# Patient Record
Sex: MALE | Race: OTHER | HISPANIC OR LATINO | URBAN - METROPOLITAN AREA
[De-identification: names, ages, dates, MRNs, and addresses within clinical notes are randomized per-mention and may not be internally consistent; named-entity substitution may affect disease eponyms.]

---

## 2017-10-15 ENCOUNTER — EMERGENCY (EMERGENCY)
Facility: HOSPITAL | Age: 32
LOS: 0 days | Discharge: HOME | End: 2017-10-15
Attending: EMERGENCY MEDICINE | Admitting: EMERGENCY MEDICINE
Payer: COMMERCIAL

## 2017-10-15 VITALS
RESPIRATION RATE: 20 BRPM | HEART RATE: 83 BPM | DIASTOLIC BLOOD PRESSURE: 85 MMHG | TEMPERATURE: 98 F | OXYGEN SATURATION: 100 % | SYSTOLIC BLOOD PRESSURE: 146 MMHG

## 2017-10-15 VITALS — WEIGHT: 179.9 LBS

## 2017-10-15 DIAGNOSIS — M54.30 SCIATICA, UNSPECIFIED SIDE: ICD-10-CM

## 2017-10-15 DIAGNOSIS — M54.32 SCIATICA, LEFT SIDE: ICD-10-CM

## 2017-10-15 PROCEDURE — 99283 EMERGENCY DEPT VISIT LOW MDM: CPT

## 2017-10-15 RX ORDER — CYCLOBENZAPRINE HYDROCHLORIDE 10 MG/1
1 TABLET, FILM COATED ORAL
Qty: 15 | Refills: 0 | OUTPATIENT
Start: 2017-10-15 | End: 2017-10-20

## 2017-10-15 RX ORDER — KETOROLAC TROMETHAMINE 30 MG/ML
30 SYRINGE (ML) INJECTION ONCE
Qty: 0 | Refills: 0 | Status: DISCONTINUED | OUTPATIENT
Start: 2017-10-15 | End: 2017-10-15

## 2017-10-15 RX ORDER — DIAZEPAM 5 MG
5 TABLET ORAL ONCE
Qty: 0 | Refills: 0 | Status: DISCONTINUED | OUTPATIENT
Start: 2017-10-15 | End: 2017-10-15

## 2017-10-15 RX ADMIN — Medication 30 MILLIGRAM(S): at 16:45

## 2017-10-15 RX ADMIN — Medication 5 MILLIGRAM(S): at 16:45

## 2017-10-15 NOTE — ED STATDOCS - NS_ ATTENDINGSCRIBEDETAILS _ED_A_ED_FT
Jason Coley DO (Attending): The history, relevant review of systems, past medical and surgical history, medical decision making, and physical examination was documented by the scribe in my presence and I attest to the accuracy of the documentation.

## 2017-10-15 NOTE — ED STATDOCS - PHYSICAL EXAMINATION
GEN: AOX3, NAD. HEENT: Throat clear. Head NC/AT. NECK: Supple, No JVD. FROM. C-spine non-tender. CV:S1S2, RRR, LUNGS: CTA/b/l, no w/r/r. ABD: Soft, NT/ND, no rebound, no guarding. No CVAT. EXT: No e/c/c. 2+ distal pulses. SKIN: No rashes. BACK: +Mild muscular tenderness left buttock/lower back area. NEURO: No focal deficits. CN II-XII intact. FROM. 5/5 motor and sensory. SATNAM Jarvis

## 2017-10-15 NOTE — ED STATDOCS - PROGRESS NOTE DETAILS
Patient is feeling much better, tests/labs reviewed. case discussed with attending. OK to dc home. SATNAM Jarvis

## 2017-10-15 NOTE — ED STATDOCS - ATTENDING CONTRIBUTION TO CARE
I, Jason Coley DO,  performed the initial face to face bedside interview with this patient regarding history of present illness, review of symptoms and relevant past medical, social and family history.  I completed an independent physical examination.  I was the initial provider who evaluated this patient. I have signed out the follow up of any pending tests (i.e. labs, radiological studies) to the ACP.  I have communicated the patient’s plan of care and disposition with the ACP.

## 2017-10-15 NOTE — ED STATDOCS - OBJECTIVE STATEMENT
32y M no PMH c/o pain radiating down left leg x 2 weeks.  Starts in left flank.  Was given analgesics but doesn't know what it is.  Finished 3 days ago.  Got Rx from PMD, states PMD didn't examine him just gave medication.  Nonsmoker, no etoh/illicits.  No F/C/N/V/D/CP/SOB. No bowel or bladder changes.  No saddle anesthesia.

## 2017-10-15 NOTE — ED STATDOCS - MUSCULOSKELETAL, MLM
LLE Strength in tact, FROM, NVI.  range of motion is not limited and there is no muscle tenderness. LLE Strength in tact, Reflexes in tact, FROM, NVI.  +TTP over piriformis.  range of motion is not limited and there is no muscle tenderness.

## 2019-02-26 ENCOUNTER — EMERGENCY (EMERGENCY)
Facility: HOSPITAL | Age: 34
LOS: 1 days | Discharge: HOME | End: 2019-02-26
Attending: EMERGENCY MEDICINE
Payer: COMMERCIAL

## 2019-02-26 VITALS
HEART RATE: 78 BPM | DIASTOLIC BLOOD PRESSURE: 74 MMHG | SYSTOLIC BLOOD PRESSURE: 128 MMHG | OXYGEN SATURATION: 99 % | RESPIRATION RATE: 18 BRPM

## 2019-02-26 VITALS
RESPIRATION RATE: 20 BRPM | HEART RATE: 81 BPM | DIASTOLIC BLOOD PRESSURE: 71 MMHG | TEMPERATURE: 98 F | HEIGHT: 67 IN | OXYGEN SATURATION: 99 % | SYSTOLIC BLOOD PRESSURE: 131 MMHG | WEIGHT: 177.91 LBS

## 2019-02-26 PROCEDURE — 73700 CT LOWER EXTREMITY W/O DYE: CPT | Mod: 26,RT

## 2019-02-26 PROCEDURE — 76377 3D RENDER W/INTRP POSTPROCES: CPT

## 2019-02-26 PROCEDURE — 99284 EMERGENCY DEPT VISIT MOD MDM: CPT | Mod: 25

## 2019-02-26 PROCEDURE — 73650 X-RAY EXAM OF HEEL: CPT | Mod: 26,RT

## 2019-02-26 PROCEDURE — 73590 X-RAY EXAM OF LOWER LEG: CPT | Mod: 26,RT

## 2019-02-26 PROCEDURE — 73590 X-RAY EXAM OF LOWER LEG: CPT

## 2019-02-26 PROCEDURE — 73620 X-RAY EXAM OF FOOT: CPT

## 2019-02-26 PROCEDURE — 76377 3D RENDER W/INTRP POSTPROCES: CPT | Mod: 26

## 2019-02-26 PROCEDURE — 29515 APPLICATION SHORT LEG SPLINT: CPT | Mod: RT

## 2019-02-26 PROCEDURE — 73650 X-RAY EXAM OF HEEL: CPT

## 2019-02-26 PROCEDURE — 73620 X-RAY EXAM OF FOOT: CPT | Mod: 26,RT

## 2019-02-26 PROCEDURE — 29515 APPLICATION SHORT LEG SPLINT: CPT

## 2019-02-26 PROCEDURE — 73700 CT LOWER EXTREMITY W/O DYE: CPT

## 2019-02-26 PROCEDURE — 73610 X-RAY EXAM OF ANKLE: CPT

## 2019-02-26 PROCEDURE — 73610 X-RAY EXAM OF ANKLE: CPT | Mod: 26,RT

## 2019-02-26 RX ORDER — IBUPROFEN 200 MG
600 TABLET ORAL ONCE
Qty: 0 | Refills: 0 | Status: COMPLETED | OUTPATIENT
Start: 2019-02-26 | End: 2019-02-26

## 2019-02-26 RX ORDER — OXYCODONE AND ACETAMINOPHEN 5; 325 MG/1; MG/1
1 TABLET ORAL ONCE
Qty: 0 | Refills: 0 | Status: DISCONTINUED | OUTPATIENT
Start: 2019-02-26 | End: 2019-02-26

## 2019-02-26 RX ADMIN — OXYCODONE AND ACETAMINOPHEN 1 TABLET(S): 5; 325 TABLET ORAL at 10:38

## 2019-02-26 RX ADMIN — Medication 600 MILLIGRAM(S): at 10:38

## 2019-02-26 NOTE — ED STATDOCS - OBJECTIVE STATEMENT
34 y/o M pt presents to the ED c/o worsening R ankle pain s/p fall from a ladder yesterday.  Pt was at work approx. 8ft up a ladder when he fell down, landing on his b/l feet.  Pt has been able to ambulate since the fall, but notes worsening ankle pain and foot pain since the fall, most severe pain when ambulating.  Denies head injury, LOC, CP, SOB, N/V.  No further acute complaints at this time.

## 2019-02-26 NOTE — ED STATDOCS - CARE PLAN
Principal Discharge DX:	Fall, initial encounter  Goal:	Evaluate and treat appropriately  Assessment and plan of treatment:	Continue with pain medication   Keep splint in place  F/U Orthopedic in 1 week.   RICE treatment protocol  Secondary Diagnosis:	Foot pain, right  Secondary Diagnosis:	Musculoskeletal leg pain, right

## 2019-02-26 NOTE — ED ADULT TRIAGE NOTE - CHIEF COMPLAINT QUOTE
Patient arrived to ED today after falling off a ladder at work about 7 feet.  Patient c/o right ankle pain.  Patient denies hitting his head or LOC or blood thinner use.

## 2019-02-26 NOTE — ED STATDOCS - MUSCULOSKELETAL, MLM
Moving all extremities spontaneously. no spinal tenderness.  FROM of R hip, normal ROM of R knee, R achillis tendon intact, swelling of R ankle and R heel, limited ROM of R ankle and R heel, hematoma along lateral aspect of R foot. Distal pulses intact.

## 2019-02-26 NOTE — ED STATDOCS - PROGRESS NOTE DETAILS
I performed the initial face to face bedside interview with this patient regarding history of present illness, review of symptoms and past medical, social and family history.  I completed an independent physical examination.  I was the initial provider who evaluated this patient.  The history, review of symptoms and examination was documented by the scribe in my presence and I attest to the accuracy of the documentation.  I have signed out the follow up of any pending tests (i.e. labs, radiological studies) to the PA.  I have discussed the patient’s plan of care and disposition with the PA. Pt moved form intake Room. Pt seen and evaluated by intake Physician. HPI, Physical examination performed by intake Physician . Note reviewed and followup examination performed by me consistent with initial assessment. Agrees with intake Physician plan and tests. No acute fracture/Normal imaging on ED read. Official Read by Radiologist review prior to D/C. Patient placed in splint and D/C in stable condition.

## 2019-02-26 NOTE — ED STATDOCS - PLAN OF CARE
Evaluate and treat appropriately Continue with pain medication   Keep splint in place  F/U Orthopedic in 1 week.   RICE treatment protocol

## 2019-02-26 NOTE — ED STATDOCS - CLINICAL SUMMARY MEDICAL DECISION MAKING FREE TEXT BOX
plan to do x-ray ankle, foot, heel, and tib-fib of R side to r/o facture, high suspicion for fracture given mechanism of injury.

## 2021-05-03 NOTE — ED STATDOCS - QUALITY
s/p fall from ladder Bexarotene Counseling:  I discussed with the patient the risks of bexarotene including but not limited to hair loss, dry lips/skin/eyes, liver abnormalities, hyperlipidemia, pancreatitis, depression/suicidal ideation, photosensitivity, drug rash/allergic reactions, hypothyroidism, anemia, leukopenia, infection, cataracts, and teratogenicity.  Patient understands that they will need regular blood tests to check lipid profile, liver function tests, white blood cell count, thyroid function tests and pregnancy test if applicable.